# Patient Record
Sex: MALE | Race: BLACK OR AFRICAN AMERICAN | NOT HISPANIC OR LATINO | Employment: UNEMPLOYED | URBAN - NONMETROPOLITAN AREA
[De-identification: names, ages, dates, MRNs, and addresses within clinical notes are randomized per-mention and may not be internally consistent; named-entity substitution may affect disease eponyms.]

---

## 2021-03-03 ENCOUNTER — OFFICE VISIT (OUTPATIENT)
Dept: OTOLARYNGOLOGY | Facility: CLINIC | Age: 26
End: 2021-03-03

## 2021-03-03 VITALS
HEIGHT: 72 IN | SYSTOLIC BLOOD PRESSURE: 104 MMHG | TEMPERATURE: 97.7 F | WEIGHT: 200 LBS | BODY MASS INDEX: 27.09 KG/M2 | DIASTOLIC BLOOD PRESSURE: 2 MMHG

## 2021-03-03 DIAGNOSIS — R22.1 NECK MASS: Primary | ICD-10-CM

## 2021-03-03 PROBLEM — K21.9 GERD (GASTROESOPHAGEAL REFLUX DISEASE): Status: ACTIVE | Noted: 2021-03-03

## 2021-03-03 PROBLEM — IMO0001 BRASH: Status: ACTIVE | Noted: 2021-03-03

## 2021-03-03 PROCEDURE — 99203 OFFICE O/P NEW LOW 30 MIN: CPT | Performed by: OTOLARYNGOLOGY

## 2021-03-03 RX ORDER — CETIRIZINE HYDROCHLORIDE 10 MG/1
10 TABLET ORAL DAILY
COMMUNITY

## 2021-03-03 NOTE — PROGRESS NOTES
ENT Office Consult Note     Date of Consult: 2021     Patient Name: Salvatore Simpson  MRN: [id number]   : 1995     Referring Provider: Thomas Goddard MD    Care Team: Patient Care Team:  Denisha Monique APRN as PCP - General (Nurse Practitioner)     Chief Complaint:    Chief Complaint   Patient presents with   • Consult   • Sore Throat       History of Present Illness: Salvatore Simpson is a 25 y.o. male who presents today for for evaluation of possible calcified left neck mass that was seen on plain soft tissue x-rays at his chiropractor's office Dr. Faviola Mccrary does not have the x-rays or the report at today's visit.  He denies any neck pain dysphagia or hoarseness..      Subjective      Review of Systems:   Review of Systems   Constitutional: Negative for fever and unexpected weight gain.   HENT: Positive for congestion, ear pain, facial swelling, sinus pressure, sore throat, swollen glands and trouble swallowing. Negative for dental problem, ear discharge, hearing loss, mouth sores, nosebleeds, postnasal drip, rhinorrhea, sneezing, tinnitus and voice change.    Eyes: Positive for blurred vision, pain and itching. Negative for double vision.   Respiratory: Positive for wheezing. Negative for cough and shortness of breath.    Gastrointestinal: Positive for indigestion. Negative for GERD.   Endocrine: Negative for cold intolerance.   Musculoskeletal: Positive for neck pain. Negative for arthralgias.   Skin: Negative for rash and bruise.   Neurological: Positive for headache. Negative for dizziness and seizures.   Hematological: Negative for adenopathy. Does not bruise/bleed easily.      I have reviewed and confirmed the accuracy of the ROS as documented by the MA/LPN/RN Dakota Goddard MD     Pertinent items are noted in HPI.     Past Medical History: History reviewed. No pertinent past medical history.    Past Surgical History:   Past Surgical History:   Procedure  "Laterality Date   • ADENOIDECTOMY     • TONSILLECTOMY         Family History: History reviewed. No pertinent family history.    Social History:   Social History     Socioeconomic History   • Marital status: Single     Spouse name: Not on file   • Number of children: Not on file   • Years of education: Not on file   • Highest education level: Not on file   Tobacco Use   • Smoking status: Current Every Day Smoker     Start date: 3/3/2018   • Smokeless tobacco: Never Used   • Tobacco comment: vape    Substance and Sexual Activity   • Alcohol use: Yes     Frequency: 2-4 times a month   • Drug use: Defer   • Sexual activity: Defer       Medications:     Current Outpatient Medications:   •  cetirizine (zyrTEC) 10 MG tablet, Take 10 mg by mouth Daily., Disp: , Rfl:     Allergies:   Allergies   Allergen Reactions   • Benadryl  [Diphenhydramine] Hives       Objective     Physical Exam:  Vital Signs:   Vitals:    03/03/21 1453   BP: (!) 104/2   Temp: 97.7 °F (36.5 °C)   Weight: 90.7 kg (200 lb)   Height: 182.9 cm (72\")     Body mass index is 27.12 kg/m².     General Appearance:  Alert, cooperative, in no acute distress   Head:  Normocephalic, without obvious abnormality, atraumatic   Eyes:          Conjunctivae and sclerae normal, PERRLA   Ears:   TMs normal   Nose:  Midline septum with pink healthy mucosa   Throat:  Status post tonsillectomy normal no oral leukoplakia   Fiberoptic Exam:  Fiberoptic laryngoscopy shows a normal neck nasopharynx base of tongue hypopharynx and larynx with no mucosal masses   Neck:  No salivary masses or cervical adenopathy or thyroid masses or any other palpable neck masses appreciated   Lungs:   Clear to auscultation,respirations regular, rebekah and unlabored      Heart:  Regular rhythm and normal rate, normal S1 and S2, no       murmur, no gallop, no rub, no click   Pulses: Pulses palpable and equal bilaterally   Skin: No bleeding, bruising or rash   Lymph nodes: No palpable adenopathy "   Neurologic: Cranial nerves 2 - 12 grossly intact        Results Review:   Labs:     Imaging: No Images in the past 120 days found..      Assessment / Plan      Assessment/Plan:   Diagnoses and all orders for this visit:    1. Neck mass (Primary)         Hermann is here for evaluation of a possible calcified neck mass as seen on x-rays at his chiropractor.  His complete head neck exam including careful palpation of the neck and also fiberoptic laryngoscopy shows no abnormal mucosal masses.  I had asked Hermann to get a copy of the x-ray report and ideally the x-rays themselves from Dr. Salmeron's office so that we might review these and determine if any other diagnostic tests such as a CT of the neck are required.  I will see him back in 1 month hopefully with these x-rays      Follow Up:   Return in about 1 month (around 4/3/2021).    Time:    Discussed plan of care in detail with patient today. Patient verbally understands and agrees. I have spent and counseled for approximately 31 minutes face to face, with greater than 50 % of the time counseling.     Dakota Goddard MD

## 2022-01-15 PROCEDURE — U0004 COV-19 TEST NON-CDC HGH THRU: HCPCS | Performed by: NURSE PRACTITIONER

## 2023-10-06 ENCOUNTER — TRANSCRIBE ORDERS (OUTPATIENT)
Dept: GENERAL RADIOLOGY | Facility: HOSPITAL | Age: 28
End: 2023-10-06
Payer: COMMERCIAL

## 2023-10-06 ENCOUNTER — HOSPITAL ENCOUNTER (OUTPATIENT)
Dept: GENERAL RADIOLOGY | Facility: HOSPITAL | Age: 28
Discharge: HOME | End: 2023-10-06
Payer: COMMERCIAL

## 2023-10-06 DIAGNOSIS — M25.512 PAIN, JOINT, SHOULDER, LEFT: Primary | ICD-10-CM

## 2023-10-06 DIAGNOSIS — M25.512 PAIN, JOINT, SHOULDER, LEFT: ICD-10-CM

## 2023-10-06 PROCEDURE — 73030 X-RAY EXAM OF SHOULDER: CPT

## 2025-04-23 ENCOUNTER — HOSPITAL ENCOUNTER (EMERGENCY)
Facility: HOSPITAL | Age: 30
Discharge: HOME | End: 2025-04-23
Attending: EMERGENCY MEDICINE | Admitting: EMERGENCY MEDICINE
Payer: MEDICAID

## 2025-04-23 VITALS
RESPIRATION RATE: 18 BRPM | HEART RATE: 83 BPM | DIASTOLIC BLOOD PRESSURE: 90 MMHG | BODY MASS INDEX: 31.56 KG/M2 | SYSTOLIC BLOOD PRESSURE: 137 MMHG | WEIGHT: 238.1 LBS | TEMPERATURE: 98.1 F | OXYGEN SATURATION: 99 % | HEIGHT: 73 IN

## 2025-04-23 DIAGNOSIS — L40.9 PSORIASIS (A TYPE OF SKIN INFLAMMATION): Primary | ICD-10-CM

## 2025-04-23 PROCEDURE — 99282 EMERGENCY DEPT VISIT SF MDM: CPT | Performed by: EMERGENCY MEDICINE

## 2025-04-23 RX ORDER — BETAMETHASONE DIPROPIONATE 0.5 MG/G
1 CREAM TOPICAL 2 TIMES DAILY
Qty: 45 G | Refills: 0 | Status: SHIPPED | OUTPATIENT
Start: 2025-04-23 | End: 2025-05-07

## 2025-04-23 NOTE — ED PROVIDER NOTES
"Subjective:  History of Present Illness:    Patient is a 29-year-old male without terming health history.  Patient reports to the ER for evaluation.  Patient reports that he has had rash to bilateral arms trunk back legs and face since December.  Patient reports that rash is pruritic.  Denies discharge.  Denies OTC medication or home remedy.  Denies alleviating or exacerbating factors.    Nurses Notes reviewed and agree, including vitals, allergies, social history and prior medical history.     REVIEW OF SYSTEMS: All systems reviewed and not pertinent unless noted.  Review of Systems   Skin:  Positive for rash.   All other systems reviewed and are negative.      History reviewed. No pertinent past medical history.    Allergies:    Benadryl [diphenhydramine]      Past Surgical History:   Procedure Laterality Date    ADENOIDECTOMY      TONSILLECTOMY           Social History     Socioeconomic History    Marital status: Single   Tobacco Use    Smoking status: Every Day     Types: Cigarettes     Start date: 3/3/2018    Smokeless tobacco: Never    Tobacco comments:     vape    Substance and Sexual Activity    Alcohol use: Yes    Drug use: Defer    Sexual activity: Defer         History reviewed. No pertinent family history.    Objective  Physical Exam:  /61 (BP Location: Left arm, Patient Position: Sitting)   Pulse (!) 45   Temp 98.1 °F (36.7 °C) (Oral)   Resp 18   Ht 185.4 cm (72.99\")   Wt 108 kg (238 lb 1.6 oz)   SpO2 99%   BMI 31.42 kg/m²      Physical Exam  Vitals and nursing note reviewed.   Constitutional:       Appearance: Normal appearance. He is normal weight.   HENT:      Head: Normocephalic and atraumatic.      Nose: Nose normal.      Mouth/Throat:      Mouth: Mucous membranes are moist.      Pharynx: Oropharynx is clear.   Eyes:      Extraocular Movements: Extraocular movements intact.      Conjunctiva/sclera: Conjunctivae normal.      Pupils: Pupils are equal, round, and reactive to light. "   Cardiovascular:      Rate and Rhythm: Normal rate and regular rhythm.      Pulses: Normal pulses.      Heart sounds: Normal heart sounds.   Pulmonary:      Effort: Pulmonary effort is normal.      Breath sounds: Normal breath sounds.   Abdominal:      General: Abdomen is flat. Bowel sounds are normal.      Palpations: Abdomen is soft.   Musculoskeletal:         General: Normal range of motion.      Cervical back: Normal range of motion and neck supple.   Skin:     General: Skin is warm and dry.      Capillary Refill: Capillary refill takes less than 2 seconds.      Findings: Rash present.   Neurological:      General: No focal deficit present.      Mental Status: He is alert and oriented to person, place, and time. Mental status is at baseline.   Psychiatric:         Mood and Affect: Mood normal.         Behavior: Behavior normal.         Thought Content: Thought content normal.         Judgment: Judgment normal.         Procedures    ED Course:         Lab Results (last 24 hours)       ** No results found for the last 24 hours. **             No radiology results from the last 24 hrs       MDM      Initial impression of presenting illness: Patient is a 29-year-old male without terming health history.  Patient reports to the ER for evaluation.  Patient reports that he has had rash to bilateral arms trunk back legs and face since December.  Patient reports that rash is pruritic.  Denies discharge.  Denies OTC medication or home remedy.  Denies alleviating or exacerbating factors.    DDX: includes but is not limited to: Psoriasis, eczema, contact dermatitis, or other    Patient arrives stable with vitals interpreted by myself.     Pertinent features from physical exam: There is a macular scaly type rash to bilateral arm, chest, abdomen, back, bilateral legs..    Initial diagnostic plan: N/A    Results from initial plan were reviewed and interpreted by me revealing N/A    Diagnostic information from other sources:  Chart review    Interventions / Re-evaluation: Vital signs stable throughout encounter    Results/clinical rationale were discussed with patient    Consultations/Discussion of results with other physicians: N/A    Disposition plan: Patient is hemodynamically stable nontoxic-appearing appropriate discharge.  Outpatient follow-up with dermatology.  Follow-up with ER for new or worsening symptoms.  -----        Final diagnoses:   Psoriasis (a type of skin inflammation)          Pepe Enrique, APRN  04/23/25 1252